# Patient Record
Sex: MALE | Race: WHITE | Employment: FULL TIME | URBAN - METROPOLITAN AREA
[De-identification: names, ages, dates, MRNs, and addresses within clinical notes are randomized per-mention and may not be internally consistent; named-entity substitution may affect disease eponyms.]

---

## 2019-07-26 ENCOUNTER — HOSPITAL ENCOUNTER (OUTPATIENT)
Age: 54
Setting detail: OUTPATIENT SURGERY
Discharge: HOME | End: 2019-07-26
Attending: UROLOGY | Admitting: UROLOGY
Payer: COMMERCIAL

## 2019-07-26 ENCOUNTER — ANESTHESIA EVENT (OUTPATIENT)
Dept: SURGERY | Age: 54
End: 2019-07-26
Payer: COMMERCIAL

## 2019-07-26 ENCOUNTER — APPOINTMENT (OUTPATIENT)
Dept: GENERAL RADIOLOGY | Age: 54
End: 2019-07-26
Attending: UROLOGY
Payer: COMMERCIAL

## 2019-07-26 ENCOUNTER — ANESTHESIA (OUTPATIENT)
Dept: SURGERY | Age: 54
End: 2019-07-26
Payer: COMMERCIAL

## 2019-07-26 VITALS
TEMPERATURE: 97.5 F | RESPIRATION RATE: 19 BRPM | OXYGEN SATURATION: 96 % | WEIGHT: 315 LBS | DIASTOLIC BLOOD PRESSURE: 61 MMHG | HEIGHT: 76 IN | BODY MASS INDEX: 38.36 KG/M2 | HEART RATE: 86 BPM | SYSTOLIC BLOOD PRESSURE: 122 MMHG

## 2019-07-26 PROBLEM — N13.2 HYDRONEPHROSIS WITH URETERAL CALCULUS: Status: ACTIVE | Noted: 2019-07-26

## 2019-07-26 LAB — GLUCOSE BLD STRIP.AUTO-MCNC: 103 MG/DL (ref 70–110)

## 2019-07-26 PROCEDURE — C2617 STENT, NON-COR, TEM W/O DEL: HCPCS | Performed by: UROLOGY

## 2019-07-26 PROCEDURE — 77030020782 HC GWN BAIR PAWS FLX 3M -B: Performed by: UROLOGY

## 2019-07-26 PROCEDURE — 74420 UROGRAPHY RTRGR +-KUB: CPT

## 2019-07-26 PROCEDURE — 74011000250 HC RX REV CODE- 250

## 2019-07-26 PROCEDURE — 76210000006 HC OR PH I REC 0.5 TO 1 HR: Performed by: UROLOGY

## 2019-07-26 PROCEDURE — 74011636320 HC RX REV CODE- 636/320: Performed by: UROLOGY

## 2019-07-26 PROCEDURE — 77030012508 HC MSK AIRWY LMA AMBU -A: Performed by: ANESTHESIOLOGY

## 2019-07-26 PROCEDURE — C1769 GUIDE WIRE: HCPCS | Performed by: UROLOGY

## 2019-07-26 PROCEDURE — 82360 CALCULUS ASSAY QUANT: CPT

## 2019-07-26 PROCEDURE — 77030018836 HC SOL IRR NACL ICUM -A: Performed by: UROLOGY

## 2019-07-26 PROCEDURE — 74011250636 HC RX REV CODE- 250/636

## 2019-07-26 PROCEDURE — 77030006974 HC BSKT URET RTVR BSC -C: Performed by: UROLOGY

## 2019-07-26 PROCEDURE — 77030014023 HC SYR INFL LVEEN BSC -B: Performed by: UROLOGY

## 2019-07-26 PROCEDURE — 74011250636 HC RX REV CODE- 250/636: Performed by: UROLOGY

## 2019-07-26 PROCEDURE — 76210000020 HC REC RM PH II FIRST 0.5 HR: Performed by: UROLOGY

## 2019-07-26 PROCEDURE — 76010000149 HC OR TIME 1 TO 1.5 HR: Performed by: UROLOGY

## 2019-07-26 PROCEDURE — C1894 INTRO/SHEATH, NON-LASER: HCPCS | Performed by: UROLOGY

## 2019-07-26 PROCEDURE — 77030040361 HC SLV COMPR DVT MDII -B: Performed by: UROLOGY

## 2019-07-26 PROCEDURE — 82962 GLUCOSE BLOOD TEST: CPT

## 2019-07-26 PROCEDURE — 77030010103 HC SEAL PRT ENDOSC OCOA -B: Performed by: UROLOGY

## 2019-07-26 PROCEDURE — C1758 CATHETER, URETERAL: HCPCS | Performed by: UROLOGY

## 2019-07-26 PROCEDURE — 76060000033 HC ANESTHESIA 1 TO 1.5 HR: Performed by: UROLOGY

## 2019-07-26 DEVICE — VARIABLE LENGTH URETERAL STENT
Type: IMPLANTABLE DEVICE | Site: URETER | Status: FUNCTIONAL
Brand: CONTOUR VL™

## 2019-07-26 RX ORDER — GLYCOPYRROLATE 0.2 MG/ML
INJECTION INTRAMUSCULAR; INTRAVENOUS AS NEEDED
Status: DISCONTINUED | OUTPATIENT
Start: 2019-07-26 | End: 2019-07-26 | Stop reason: HOSPADM

## 2019-07-26 RX ORDER — SODIUM CHLORIDE, SODIUM LACTATE, POTASSIUM CHLORIDE, CALCIUM CHLORIDE 600; 310; 30; 20 MG/100ML; MG/100ML; MG/100ML; MG/100ML
125 INJECTION, SOLUTION INTRAVENOUS CONTINUOUS
Status: DISCONTINUED | OUTPATIENT
Start: 2019-07-26 | End: 2019-07-26 | Stop reason: HOSPADM

## 2019-07-26 RX ORDER — CEFAZOLIN SODIUM/WATER 2 G/20 ML
2 SYRINGE (ML) INTRAVENOUS ONCE
Status: DISCONTINUED | OUTPATIENT
Start: 2019-07-26 | End: 2019-07-26

## 2019-07-26 RX ORDER — CEPHALEXIN 500 MG/1
500 CAPSULE ORAL 4 TIMES DAILY
Qty: 20 CAP | Refills: 0 | Status: SHIPPED | OUTPATIENT
Start: 2019-07-26 | End: 2019-07-31

## 2019-07-26 RX ORDER — SODIUM CHLORIDE 0.9 % (FLUSH) 0.9 %
5-40 SYRINGE (ML) INJECTION AS NEEDED
Status: DISCONTINUED | OUTPATIENT
Start: 2019-07-26 | End: 2019-07-26 | Stop reason: HOSPADM

## 2019-07-26 RX ORDER — MAGNESIUM SULFATE 100 %
4 CRYSTALS MISCELLANEOUS AS NEEDED
Status: DISCONTINUED | OUTPATIENT
Start: 2019-07-26 | End: 2019-07-26 | Stop reason: HOSPADM

## 2019-07-26 RX ORDER — INSULIN LISPRO 100 [IU]/ML
INJECTION, SOLUTION INTRAVENOUS; SUBCUTANEOUS ONCE
Status: DISCONTINUED | OUTPATIENT
Start: 2019-07-26 | End: 2019-07-26 | Stop reason: HOSPADM

## 2019-07-26 RX ORDER — SODIUM CHLORIDE, SODIUM LACTATE, POTASSIUM CHLORIDE, CALCIUM CHLORIDE 600; 310; 30; 20 MG/100ML; MG/100ML; MG/100ML; MG/100ML
100 INJECTION, SOLUTION INTRAVENOUS CONTINUOUS
Status: DISCONTINUED | OUTPATIENT
Start: 2019-07-26 | End: 2019-07-26 | Stop reason: HOSPADM

## 2019-07-26 RX ORDER — FENTANYL CITRATE 50 UG/ML
INJECTION, SOLUTION INTRAMUSCULAR; INTRAVENOUS AS NEEDED
Status: DISCONTINUED | OUTPATIENT
Start: 2019-07-26 | End: 2019-07-26 | Stop reason: HOSPADM

## 2019-07-26 RX ORDER — LIDOCAINE HYDROCHLORIDE 20 MG/ML
INJECTION, SOLUTION EPIDURAL; INFILTRATION; INTRACAUDAL; PERINEURAL AS NEEDED
Status: DISCONTINUED | OUTPATIENT
Start: 2019-07-26 | End: 2019-07-26 | Stop reason: HOSPADM

## 2019-07-26 RX ORDER — PROPOFOL 10 MG/ML
INJECTION, EMULSION INTRAVENOUS AS NEEDED
Status: DISCONTINUED | OUTPATIENT
Start: 2019-07-26 | End: 2019-07-26 | Stop reason: HOSPADM

## 2019-07-26 RX ORDER — NALOXONE HYDROCHLORIDE 0.4 MG/ML
0.1 INJECTION, SOLUTION INTRAMUSCULAR; INTRAVENOUS; SUBCUTANEOUS AS NEEDED
Status: DISCONTINUED | OUTPATIENT
Start: 2019-07-26 | End: 2019-07-26 | Stop reason: HOSPADM

## 2019-07-26 RX ORDER — HYDROMORPHONE HYDROCHLORIDE 2 MG/ML
0.5 INJECTION, SOLUTION INTRAMUSCULAR; INTRAVENOUS; SUBCUTANEOUS
Status: DISCONTINUED | OUTPATIENT
Start: 2019-07-26 | End: 2019-07-26 | Stop reason: HOSPADM

## 2019-07-26 RX ORDER — SODIUM CHLORIDE 0.9 % (FLUSH) 0.9 %
5-40 SYRINGE (ML) INJECTION EVERY 8 HOURS
Status: DISCONTINUED | OUTPATIENT
Start: 2019-07-26 | End: 2019-07-26 | Stop reason: HOSPADM

## 2019-07-26 RX ORDER — ONDANSETRON 2 MG/ML
4 INJECTION INTRAMUSCULAR; INTRAVENOUS ONCE
Status: DISCONTINUED | OUTPATIENT
Start: 2019-07-26 | End: 2019-07-26 | Stop reason: HOSPADM

## 2019-07-26 RX ORDER — ONDANSETRON 2 MG/ML
INJECTION INTRAMUSCULAR; INTRAVENOUS AS NEEDED
Status: DISCONTINUED | OUTPATIENT
Start: 2019-07-26 | End: 2019-07-26 | Stop reason: HOSPADM

## 2019-07-26 RX ORDER — MIDAZOLAM HYDROCHLORIDE 1 MG/ML
INJECTION, SOLUTION INTRAMUSCULAR; INTRAVENOUS AS NEEDED
Status: DISCONTINUED | OUTPATIENT
Start: 2019-07-26 | End: 2019-07-26 | Stop reason: HOSPADM

## 2019-07-26 RX ADMIN — SODIUM CHLORIDE, SODIUM LACTATE, POTASSIUM CHLORIDE, AND CALCIUM CHLORIDE 125 ML/HR: 600; 310; 30; 20 INJECTION, SOLUTION INTRAVENOUS at 12:16

## 2019-07-26 RX ADMIN — ONDANSETRON 4 MG: 2 INJECTION INTRAMUSCULAR; INTRAVENOUS at 14:33

## 2019-07-26 RX ADMIN — PROPOFOL 200 MG: 10 INJECTION, EMULSION INTRAVENOUS at 14:31

## 2019-07-26 RX ADMIN — LIDOCAINE HYDROCHLORIDE 80 MG: 20 INJECTION, SOLUTION EPIDURAL; INFILTRATION; INTRACAUDAL; PERINEURAL at 14:31

## 2019-07-26 RX ADMIN — CEFAZOLIN SODIUM 3 G: 10 INJECTION, POWDER, FOR SOLUTION INTRAVENOUS at 14:26

## 2019-07-26 RX ADMIN — MIDAZOLAM HYDROCHLORIDE 2 MG: 1 INJECTION, SOLUTION INTRAMUSCULAR; INTRAVENOUS at 14:23

## 2019-07-26 RX ADMIN — SODIUM CHLORIDE, SODIUM LACTATE, POTASSIUM CHLORIDE, AND CALCIUM CHLORIDE: 600; 310; 30; 20 INJECTION, SOLUTION INTRAVENOUS at 14:55

## 2019-07-26 RX ADMIN — FENTANYL CITRATE 50 MCG: 50 INJECTION, SOLUTION INTRAMUSCULAR; INTRAVENOUS at 14:57

## 2019-07-26 RX ADMIN — GLYCOPYRROLATE 0.2 MG: 0.2 INJECTION INTRAMUSCULAR; INTRAVENOUS at 14:23

## 2019-07-26 RX ADMIN — FENTANYL CITRATE 50 MCG: 50 INJECTION, SOLUTION INTRAMUSCULAR; INTRAVENOUS at 15:03

## 2019-07-26 RX ADMIN — FENTANYL CITRATE 100 MCG: 50 INJECTION, SOLUTION INTRAMUSCULAR; INTRAVENOUS at 15:17

## 2019-07-26 RX ADMIN — FENTANYL CITRATE 100 MCG: 50 INJECTION, SOLUTION INTRAMUSCULAR; INTRAVENOUS at 14:31

## 2019-07-26 NOTE — DISCHARGE INSTRUCTIONS
DISCHARGE SUMMARY from Nurse    PATIENT INSTRUCTIONS:    After general anesthesia or intravenous sedation, for 24 hours or while taking prescription Narcotics:  · Limit your activities  · Do not drive and operate hazardous machinery  · Do not make important personal or business decisions  · Do  not drink alcoholic beverages  · If you have not urinated within 8 hours after discharge, please contact your surgeon on call. Report the following to your surgeon:  · Excessive pain, swelling, redness or odor of or around the surgical area  · Temperature over 100.5  · Nausea and vomiting lasting longer than 4 hours or if unable to take medications  · Any signs of decreased circulation or nerve impairment to extremity: change in color, persistent  numbness, tingling, coldness or increase pain  · Any questions    What to do at Home:  Recommended activity: Activity as tolerated and no driving for today. If you experience any of the following symptoms   Notify [facility] immediately if:  Unable to urinate, urgency to urinate is not uncommon  Severe abdominal discomfort with minimal urine output  Bright red blood in urine or clots, some blood is normal and should be decreased with increased fluid  Fever over 101.5 or chills  Nausea/vomiting or are unable to keep down fluids  Pain that isn't controlled with medication prescribed. Please follow up with Dr. Sinan Jaime. *  Please give a list of your current medications to your Primary Care Provider. *  Please update this list whenever your medications are discontinued, doses are      changed, or new medications (including over-the-counter products) are added. *  Please carry medication information at all times in case of emergency situations.     These are general instructions for a healthy lifestyle:    No smoking/ No tobacco products/ Avoid exposure to second hand smoke  Surgeon General's Warning:  Quitting smoking now greatly reduces serious risk to your health. Obesity, smoking, and sedentary lifestyle greatly increases your risk for illness    A healthy diet, regular physical exercise & weight monitoring are important for maintaining a healthy lifestyle    You may be retaining fluid if you have a history of heart failure or if you experience any of the following symptoms:  Weight gain of 3 pounds or more overnight or 5 pounds in a week, increased swelling in our hands or feet or shortness of breath while lying flat in bed. Please call your doctor as soon as you notice any of these symptoms; do not wait until your next office visit. Patient armband removed and shredded    The discharge information has been reviewed with the patient and caregiver. The patient and caregiver verbalized understanding. Discharge medications reviewed with the patient and caregiver and appropriate educational materials and side effects teaching were provided.   ___________________________________________________________________________________________________________________________________

## 2019-07-26 NOTE — PERIOP NOTES
Patient discharged. Discharge instructions reviewed with patient and wife, all questions answered. Prescription provided to take to pharmacy, side effects discussed. Patient left unit with wife to go home.

## 2019-07-26 NOTE — ANESTHESIA PREPROCEDURE EVALUATION
Relevant Problems   No relevant active problems       Anesthetic History   No history of anesthetic complications            Review of Systems / Medical History  Patient summary reviewed, nursing notes reviewed and pertinent labs reviewed    Pulmonary  Within defined limits                 Neuro/Psych   Within defined limits           Cardiovascular    Hypertension: well controlled              Exercise tolerance: >4 METS     GI/Hepatic/Renal                Endo/Other        Arthritis     Other Findings              Physical Exam    Airway  Mallampati: II  TM Distance: 4 - 6 cm  Neck ROM: normal range of motion   Mouth opening: Normal     Cardiovascular  Regular rate and rhythm,  S1 and S2 normal,  no murmur, click, rub, or gallop  Rhythm: regular  Rate: normal         Dental  No notable dental hx       Pulmonary  Breath sounds clear to auscultation               Abdominal  GI exam deferred       Other Findings            Anesthetic Plan    ASA: 3  Anesthesia type: general          Induction: Intravenous  Anesthetic plan and risks discussed with: Patient and Spouse

## 2019-07-26 NOTE — H&P
Urology [address]  [address] [facility] [address] [facility] [place]  [address]  Tel: [phone]  Fax: [phone]        Patient: Myesha Mcelroy  YOB: 1965          Completed Orders (this encounter)  Order Interpretation Result Next Lab Date   URINALYSIS, AUTO, W/O SCOPE see detail Test 1Color: yellow. Clarity: clear. Glucose: negative. Bilirubin: negative. Ketones: negative. Specific Gravity: 1.025. Blood: moderate. pH: 5.5. Protein: trace. Urobilinogen: 0.2 mg/dl  Nitrite: negative. Leukocytes: negative. Assessment/Plan  # Detail Type Description    1. Assessment Hydronephrosis with ureteral calculous obstruction (N13.2). Patient Plan  The risks and benefits of observation / medical expulsive therapy, ESWL, and ureteroscopy were discussed. The patient will proceed with cysto and LEFT ureteroscopy with laser lithotripsy and probable stent. Risks of bleeding, infection, injury, and possible need for more than 1 procedure were discussed. 2. Other Orders Orders not associated to today's assessments. Plan Orders The patient had the following test(s) completed today: URINALYSIS, AUTO, W/O SCOPE. This 53year old male presents for Kidney and/or Ureteral Stone. History of Present Illness:  1. Kidney and/or Ureteral Stone   Onset was 1 week ago. Severity level is 10. It occurs intermittently. The problem is worse. Location of pain is left flank. The pain does not radiate. Prior stone type of unknown. Pertinent history includes family history of stones. There are no aggravating factors. Pertinent negatives include chills, constipation, diarrhea, fever, nausea and vomiting. Additional information: He passed a 3mm stone last year. CT 7/22/19 -- 11mm left prox ureteral stone with hydro. .              Medical/Surgical/Interim History  Reviewed, no change. Last detailed document date:07/26/2018. PROBLEM LIST:   Problem List reviewed.          Medications (active prior to today)  Medication Instructions Start Date Stop Date Refilled Elsewhere   Flomax 0.4 mg capsule take 1 capsule by oral route  every day 1/2 hour following the same meal each day //   Y   Ketorolac 10 mg ORAL TABLET Take one tablet by mouth daily //   Y   oxycodone-acetaminophen 5 mg-325 mg tablet take 1 tablet by oral route  every 6 hours as needed //   Y   sennosides 8.6 mg-docusate sodium 50 mg tablet take 2 tablet by oral route  every day //   Y   Zofran 4 mg tablet take 2 tablet by oral route  every 8 hours for 2 days //   Y     Medication Reconciliation  Medications reconciled today. Medication Reviewed  Adherence Medication Name Sig Desc Elsewhere Status   taking as directed Flomax 0.4 mg capsule take 1 capsule by oral route  every day 1/2 hour following the same meal each day Y Verified   taking as directed Ketorolac 10 mg ORAL TABLET Take one tablet by mouth daily Y Verified   taking as directed oxycodone-acetaminophen 5 mg-325 mg tablet take 1 tablet by oral route  every 6 hours as needed Y Verified   taking as directed sennosides 8.6 mg-docusate sodium 50 mg tablet take 2 tablet by oral route  every day Y Verified   taking as directed Zofran 4 mg tablet take 2 tablet by oral route  every 8 hours for 2 days Y Verified     Allergies  Ingredient Reaction (Severity) Medication Name Comment   NO KNOWN ALLERGIES        Reviewed, no changes. Family History:  Reviewed, no changes. Last detailed document date:07/26/2018. Social History:  Reviewed, no changes. Last detailed document date: 07/26/2018. Review of Systems  System Neg/Pos Details   Constitutional Negative Chills and Fever. ENMT Negative Ear infections and Sore throat. Eyes Negative Blurred vision, Double vision and Eye pain. Respiratory Negative Asthma, Chronic cough, Dyspnea and Wheezing. Cardio Negative Chest pain. GI Negative Constipation, Decreased appetite, Diarrhea, Nausea and Vomiting.    Endocrine Negative Cold intolerance, Heat intolerance, Increased thirst and Weight loss. Neuro Negative Headache and Tremors. Psych Negative Anxiety and Depression. Integumentary Negative Itching skin and Rash. MS Negative Back pain and Joint pain. Hema/Lymph Negative Easy bleeding. Reproductive Negative Sexual dysfunction. Vital Signs     Height  Time ft in cm Last Measured Height Position   11:22 AM 6.0 4.00 193.04 07/26/2018 0     Weight/BSA/BMI  Time lb oz kg Context BMI kg/m2 BSA m2   11:22 .00  151.046  40.53      Measured By  Time Measured by   11:22 AM Patrick Lyons       Physical Exam  Exam Findings Details   Constitutional Normal Well developed. Neck Exam Normal Inspection - Normal.   Respiratory Normal Inspection - Normal.   Abdomen Normal No abdominal tenderness. Genitourinary * CVA Tenderness. Genitourinary Normal No suprapubic tenderness. Extremity Normal No edema. Psychiatric Normal Orientation - Oriented to time, place, person & situation. Appropriate mood and affect.          Medications (added, continued, or stopped today)  Start Date Medication Directions PRN Status PRN Reason Instruction Stop Date    Flomax 0.4 mg capsule take 1 capsule by oral route  every day 1/2 hour following the same meal each day N       Ketorolac 10 mg ORAL TABLET Take one tablet by mouth daily N       oxycodone-acetaminophen 5 mg-325 mg tablet take 1 tablet by oral route  every 6 hours as needed N       sennosides 8.6 mg-docusate sodium 50 mg tablet take 2 tablet by oral route  every day N       Zofran 4 mg tablet take 2 tablet by oral route  every 8 hours for 2 days N      Active Patient Care Team Members    Name Contact Agency Type Support Role Relationship Active Date Inactive Date Specialty   Racheal Pérez   Patient provider PCP      Sable Olp   Emergency Contact Spouse          Provider:       Gail Purdy MD

## 2019-07-26 NOTE — ANESTHESIA POSTPROCEDURE EVALUATION
Post-Anesthesia Evaluation and Assessment    Cardiovascular Function/Vital Signs  Visit Vitals  /60   Pulse 86   Temp 36.4 °C (97.5 °F)   Resp 18   Ht 6' 4\" (1.93 m)   Wt 152.6 kg (336 lb 5 oz)   SpO2 95%   BMI 40.94 kg/m²       Patient is status post Procedure(s):  CYSTOSCOPY, LEFT RETROGRADE PYELOGRAM WITH INTERPRETATION, LEFT URETEROSCOPY, LASER LITHOTRIPSY WITH HOLMIUM, STENT PLACEMENT WITH C-ARM. Nausea/Vomiting: Controlled. Postoperative hydration reviewed and adequate. Pain:  Pain Scale 1: Numeric (0 - 10) (07/26/19 1602)  Pain Intensity 1: 0 (07/26/19 1602)   Managed. Neurological Status:   Neuro (WDL): Within Defined Limits (07/26/19 1226)   At baseline. Mental Status and Level of Consciousness: Arousable. Pulmonary Status:   O2 Device: Nasal cannula (07/26/19 1602)   Adequate oxygenation and airway patent. Complications related to anesthesia: None    Post-anesthesia assessment completed. No concerns. Patient has met all discharge requirements.     Signed By: Baldemar Delgado MD    July 26, 2019

## 2019-07-26 NOTE — PERIOP NOTES
Reviewed PTA medication list with patient/caregiver and patient/caregiver denies any additional medications. Patient admits to having a responsible adult care for them for at least 24 hours after surgery.     Dual skin assessment completed by Jennyfer KONG and KEIRY Love RN.

## 2019-07-26 NOTE — BRIEF OP NOTE
BRIEF OPERATIVE NOTE    Date of Procedure: 7/26/2019   Preoperative Diagnosis: LEFT URETERAL STONE WITH HYDRO  Postoperative Diagnosis: LEFT URETERAL STONE WITH HYDRO    Procedure(s):  CYSTOSCOPY, LEFT RETROGRADE PYELOGRAM WITH INTERPRETATION, LEFT URETEROSCOPY, LASER LITHOTRIPSY WITH HOLMIUM, STENT PLACEMENT WITH C-ARM  Surgeon(s) and Role:     * Nenita Puentes MD - Primary         Surgical Assistant: NONE    Surgical Staff:  Circ-1: Giovana Garber RN  Circ-Relief: Trye Laws RN  Radiology Technician: Lauren Rowe  Scrub Tech-1: Lizeth Wallace  Scrub Tech-Relief: Elena Khan RN-1: Sudeep Coelho RN  Event Time In Time Out   Incision Start 1439    Incision Close 1530      Anesthesia: General   Estimated Blood Loss: NONE  Specimens:   ID Type Source Tests Collected by Time Destination   1 : VASYL Dailey MD 7/26/2019 1516 Pathology      Findings: VERY IMPACTED STONE WITH LOTS OF INFLAMMATION IN THE PROXIMAL URETER. STONE LASERED COMPLETELY AND LARGEST FRAGMENTS EXTRACTED  Complications: NONE  Implants:   Implant Name Type Inv.  Item Serial No.  Lot No. LRB No. Used Action   Barrett Lawton CONTOUR [id number] -- [facility] - [id number]  Barrett Katty CONTOUR [id number] -- [facility] 67 [id number] Left 1 Implanted

## 2019-07-27 NOTE — OP NOTES
[address] REPORT    Name:  Sonja Cabrera  MR#:   [id number]  :  1965  ACCOUNT #:  [de-identified]  DATE OF SERVICE:  2019    PREOPERATIVE DIAGNOSIS:  Left ureteral stone with hydronephrosis. POSTOPERATIVE DIAGNOSIS: Left ureteral stone with hydronephrosis. PROCEDURES PERFORMED:  Cystoscopy, left retrograde pyelogram with interpretation, left ureteroscopy with holmium laser lithotripsy and stent placement. SURGEON:  Joie Alba. Anthony Lyon MD.    ASSISTANT:  None. ANESTHESIA:  General.    COMPLICATIONS:  none    SPECIMENS REMOVED:  Left ureteral stones. IMPLANTS:   A 6-Northern Irish variable length stent. ESTIMATED BLOOD LOSS:  None. FINDINGS:  The patient had a very impacted stone in the proximal left ureter with lots of inflammation and bullous edema around the stone. This stone was lasered completely and the largest fragments were extracted. The patient was then implanted with a 6-Northern Irish short variable length stent. CLINICAL NOTE:  The patient is a 50-year-old gentleman with left-sided pain who was noted to have a large stone. He presents for ureteroscopy. OPERATIVE REPORT:  Preoperatively, risks and benefits of surgery were described to the patient where the risks include but were not limited to bleeding, infection, injury to the bladder, injury to the ureter, injury to the kidney, and possible need for future procedure to be made stone-free. The patient understood the risks and signed the informed consent. The patient was taken to the operating room, placed on the OR table in supine position. He was administered general anesthetic. He was administered intravenous antibiotics. He was placed in dorsal lithotomy position, prepped and draped in the usual sterile manner. A 21-Northern Irish cystoscope and sheath were then inserted transurethrally atraumatically under direct vision using a 30-degree lens. Panendoscopy was done.   The bilateral ureteral orifices were in normal anatomic position. Anterior urethra was normal.  Prostatic urethra was normal.  There were no stones, no tumors, no areas of concern within the bladder. A cannulated left ureteral orifice with a 5-Danish open-ended ureteral catheter and retrograde pyelogram was done in the usual manner using 18 mL of Visipaque dye. The distal ureter and mid ureter were completely normal.  However, in the proximal ureter, the dye cut off and I was not able to get but just a hint of dye beyond it. I then tried to pass a sensor wire through the open-ended catheter up towards the kidney but I was unable to get the dye beyond the point where there appeared to be stone. I tried several times with a 0.035 and was unable to do so. I then used a 0.025 straight-tip Glidewire and got that past the stone with some difficulty. I was unable then to get the open-ended catheter over that wire though. The scope was removed. The wire was left in place up in the kidney. I then passed a dual-lumen catheter and passed a second sensor wire up to where the stone was. The dual-lumen catheter was removed. I then passed the Glidewire up to the kidney with the safety wire. Over the sensor wire, I passed a 11/13, 46 cm Navigator sheath into the proximal ureter up until it was butting against where the stone was. The inner workings of the sheath in the sensor wire  were removed. I then passed flexible ureteroscope through the sheath up towards the stone. Once I encountered the large stone, it was noted to be really impacted within the wall of the ureter and there was a lot of ureteral edema around the stone. It was very difficult to laser, especially considering there was a lot of motion artifact due to the patient's size and more labored breathing. Using a 272-micron holmium laser fiber, I broke up the stone right through the center of the stone, so as to avoid contact with the ureteral edge.   This broke up the stone into few smaller pieces. I was then able to gently push those pieces up towards the kidney. They all fell into the upper pole calyx. I continued to laser into many smaller pieces. The larger pieces were then extracted out using a Zero Tip Basket. By the end of the case, I entered every calyx in the kidney and I did not see any stone of any significant size more than about 1 to 1.5 mm. There was much dust and debris but very little stones even 1 mm. Next, I still laid back the scope and the sheath down the ureter. The ureter was intact and unharmed, and there were no significant fragments along the length of the ureter. I then reinserted the cystoscope. Over the safety wire, I passed a 6-Greenlandic variable length stent. The wire was removed. Fluoroscopy confirmed the proximal coil within the kidney. The distal coil was noted to be in the bladder by direct vision. At this point, the patient was taken out of the lithotomy position, revived from anesthesia and taken to the Recovery in stable condition.       Josh Banegas MD      [id number]  D:  07/26/2019 15:48  T:  07/26/2019 21:35  JOB #:  [id number]

## 2019-08-02 LAB
CA PHOS MFR STONE: 5 %
CALCIUM OXALATE DIHYDRATE MFR STONE IR: 20 %
COLOR STONE: NORMAL
COM MFR STONE: 75 %
COMMENT, 519994: NORMAL
COMPOSITION, 120440: NORMAL
DISCLAIMER, STO32L: NORMAL
NIDUS STONE QL: NORMAL
SIZE STONE: NORMAL MM
WT STONE: 129 MG

## (undated) DEVICE — INFLATION DEVICE: Brand: ENCORE 26

## (undated) DEVICE — SOLUTION IRRIG 3000ML 0.9% SOD CHL FLX CONT 0797208] ICU MEDICAL INC]

## (undated) DEVICE — CYSTO PACK: Brand: MEDLINE INDUSTRIES, INC.

## (undated) DEVICE — GARMENT,MEDLINE,DVT,INT,CALF,MED, GEN2: Brand: MEDLINE

## (undated) DEVICE — TRAP MUCUS SPECIMEN 40ML -- MEDICHOICE

## (undated) DEVICE — URETERAL ACCESS SHEATH SET: Brand: NAVIGATOR HD

## (undated) DEVICE — NITINOL STONE RETRIEVAL BASKET: Brand: ZERO TIP

## (undated) DEVICE — STRAP,POSITIONING,KNEE/BODY,FOAM,4X60": Brand: MEDLINE

## (undated) DEVICE — DUAL LUMEN URETERAL CATHETER

## (undated) DEVICE — GUIDEWIRE URO L150CM DIA0.035IN STD NIT HYDRPHLC STR TIP

## (undated) DEVICE — SEAL ENDOSCP INSTR 7FR BX SELF SEAL

## (undated) DEVICE — DRAPE XR C ARM 41X74IN LF --

## (undated) DEVICE — STERILE POLYISOPRENE POWDER-FREE SURGICAL GLOVES: Brand: PROTEXIS